# Patient Record
Sex: MALE | Race: WHITE
[De-identification: names, ages, dates, MRNs, and addresses within clinical notes are randomized per-mention and may not be internally consistent; named-entity substitution may affect disease eponyms.]

---

## 2020-06-22 ENCOUNTER — HOSPITAL ENCOUNTER (EMERGENCY)
Dept: HOSPITAL 8 - ED | Age: 75
Discharge: HOME | End: 2020-06-22
Payer: MEDICARE

## 2020-06-22 VITALS — DIASTOLIC BLOOD PRESSURE: 78 MMHG | SYSTOLIC BLOOD PRESSURE: 126 MMHG

## 2020-06-22 VITALS — HEIGHT: 72 IN | WEIGHT: 243.39 LBS | BODY MASS INDEX: 32.97 KG/M2

## 2020-06-22 DIAGNOSIS — M79.672: ICD-10-CM
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
## 2020-06-22 DIAGNOSIS — E11.40: Primary | ICD-10-CM
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
## 2020-06-22 DIAGNOSIS — G89.11: ICD-10-CM
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
## 2020-06-22 PROCEDURE — 99283 EMERGENCY DEPT VISIT LOW MDM: CPT

## 2020-06-22 NOTE — NUR
THIS IS A 74 YEAR OLD MALE WHO  C/O LEFT PINKY AND 4TH TOE PAIN AFTER HITTING 
FOOT ON AN END TABLE TWO MONTHS AGO. PAIN WORSE OVER THE LAST WEEK.

## 2020-06-22 NOTE — NUR
BEDSIDE REPORT RECEIVED FROM [facility]. PT LAYING IN BED, CONVERSING WITH THIS 
RN, NO COMPLAINTS, NO SIGNS OF DISTRESS. PT UPDATED ON POC, ALL QUESTIONS 
ANSWERED. WILL CONTINUE TO MONITOR. SON AT BEDSIDE.

## 2020-11-12 ENCOUNTER — HOSPITAL ENCOUNTER (OUTPATIENT)
Dept: HOSPITAL 8 - CFH | Age: 75
Discharge: HOME | End: 2020-11-12
Attending: FAMILY MEDICINE
Payer: MEDICARE

## 2020-11-12 DIAGNOSIS — I21.19: Primary | ICD-10-CM

## 2020-11-12 DIAGNOSIS — R94.31: ICD-10-CM

## 2020-11-12 DIAGNOSIS — I25.9: ICD-10-CM

## 2020-11-12 PROCEDURE — A9502 TC99M TETROFOSMIN: HCPCS

## 2020-11-12 PROCEDURE — 78452 HT MUSCLE IMAGE SPECT MULT: CPT

## 2020-11-12 PROCEDURE — 93017 CV STRESS TEST TRACING ONLY: CPT

## 2020-12-07 ENCOUNTER — HOSPITAL ENCOUNTER (OUTPATIENT)
Dept: HOSPITAL 8 - CFH | Age: 75
Discharge: HOME | End: 2020-12-07
Attending: FAMILY MEDICINE
Payer: MEDICARE

## 2020-12-07 DIAGNOSIS — I08.0: Primary | ICD-10-CM

## 2020-12-07 DIAGNOSIS — R94.31: ICD-10-CM

## 2020-12-07 PROCEDURE — 93306 TTE W/DOPPLER COMPLETE: CPT
